# Patient Record
Sex: FEMALE | URBAN - METROPOLITAN AREA
[De-identification: names, ages, dates, MRNs, and addresses within clinical notes are randomized per-mention and may not be internally consistent; named-entity substitution may affect disease eponyms.]

---

## 2024-02-14 ENCOUNTER — LAB (OUTPATIENT)
Dept: URBAN - METROPOLITAN AREA CLINIC 113 | Facility: CLINIC | Age: 69
End: 2024-02-14

## 2024-02-14 ENCOUNTER — OV CON (OUTPATIENT)
Dept: URBAN - METROPOLITAN AREA CLINIC 113 | Facility: CLINIC | Age: 69
End: 2024-02-14
Payer: MEDICARE

## 2024-02-14 VITALS
DIASTOLIC BLOOD PRESSURE: 64 MMHG | BODY MASS INDEX: 32.37 KG/M2 | WEIGHT: 189.6 LBS | SYSTOLIC BLOOD PRESSURE: 112 MMHG | HEIGHT: 64 IN | RESPIRATION RATE: 20 BRPM | TEMPERATURE: 97 F | HEART RATE: 60 BPM

## 2024-02-14 DIAGNOSIS — K21.9 GERD WITHOUT ESOPHAGITIS: ICD-10-CM

## 2024-02-14 DIAGNOSIS — Z12.11 COLON CANCER SCREENING: ICD-10-CM

## 2024-02-14 DIAGNOSIS — K59.09 CHRONIC CONSTIPATION: ICD-10-CM

## 2024-02-14 PROBLEM — 266435005: Status: ACTIVE | Noted: 2024-02-14

## 2024-02-14 PROBLEM — 305058001: Status: ACTIVE | Noted: 2024-02-14

## 2024-02-14 PROBLEM — 236069009: Status: ACTIVE | Noted: 2024-02-14

## 2024-02-14 PROCEDURE — 99204 OFFICE O/P NEW MOD 45 MIN: CPT | Performed by: NURSE PRACTITIONER

## 2024-02-14 RX ORDER — RANOLAZINE 1000 MG/1
1 TABLET TABLET, EXTENDED RELEASE ORAL TWICE A DAY
Status: ACTIVE | COMMUNITY

## 2024-02-14 RX ORDER — ALBUTEROL SULFATE 90 UG/1
1 PUFF AS NEEDED AEROSOL, METERED RESPIRATORY (INHALATION)
Status: ACTIVE | COMMUNITY

## 2024-02-14 RX ORDER — ALENDRONATE SODIUM 70 MG/1
1 TABLET 30 MINUTES BEFORE THE FIRST FOOD, BEVERAGE OR MEDICINE OF THE DAY WITH PLAIN WATER TABLET ORAL
Status: ACTIVE | COMMUNITY

## 2024-02-14 RX ORDER — DOCUSATE SODIUM 100 MG/1
1 CAPSULE AS NEEDED CAPSULE ORAL TWICE A DAY
Status: ACTIVE | COMMUNITY

## 2024-02-14 RX ORDER — ONDANSETRON HYDROCHLORIDE 8 MG/1
1 TABLET AS NEEDED TABLET, FILM COATED ORAL ONCE A DAY
Status: ACTIVE | COMMUNITY

## 2024-02-14 RX ORDER — RIMEGEPANT SULFATE 75 MG/75MG
1 TABLET ON THE TONGUE AND ALLOW TO DISSOLVE TABLET, ORALLY DISINTEGRATING ORAL
Status: ACTIVE | COMMUNITY

## 2024-02-14 RX ORDER — HYDROCHLOROTHIAZIDE 25 MG/1
1 TABLET IN THE MORNING TABLET ORAL ONCE A DAY
Status: ACTIVE | COMMUNITY

## 2024-02-14 RX ORDER — METHOCARBAMOL 750 MG/1
1 TABLET TABLET ORAL TWICE DAILY
Status: ACTIVE | COMMUNITY

## 2024-02-14 RX ORDER — ISOSORBIDE MONONITRATE 30 MG/1
1 TABLET IN THE MORNING TABLET, EXTENDED RELEASE ORAL ONCE A DAY
Status: ACTIVE | COMMUNITY

## 2024-02-14 RX ORDER — OMEPRAZOLE 20 MG/1
1 CAPSULE 30 MINUTES BEFORE MORNING MEAL CAPSULE, DELAYED RELEASE ORAL ONCE A DAY
Status: ACTIVE | COMMUNITY

## 2024-02-14 RX ORDER — ATORVASTATIN CALCIUM 80 MG/1
1 TABLET TABLET, FILM COATED ORAL ONCE A DAY
Status: ACTIVE | COMMUNITY

## 2024-02-14 RX ORDER — GABAPENTIN 600 MG/1
1 TABLET TABLET, FILM COATED ORAL THREE TIMES A DAY
Status: ACTIVE | COMMUNITY

## 2024-02-14 RX ORDER — NITROGLYCERIN 0.4 MG/1
AS DIRECTED TABLET SUBLINGUAL
Status: ACTIVE | COMMUNITY

## 2024-02-14 RX ORDER — APIXABAN 5 MG/1
1 TABLET TABLET, FILM COATED ORAL TWICE A DAY
Status: ACTIVE | COMMUNITY

## 2024-02-14 NOTE — HPI-TODAY'S VISIT:
This is a 68-year-old female with a history of asthma, hypercholesterolemia, hypertension, atrial fibrillation on Eliquis, coronary artery disease status post bypass, pacemaker placement, and AICD placement referred from Dr. White for colon cancer screening. She understands some English but requires interpretation which her  provides. She had a colonoscopy 25 years ago revealing diverticulosis.  She denies a family history of colon cancer. She has a history of acid reflux which is controlled with omeprazole 20 mg daily.  She denies dysphagia or abdominal pain.  She has nausea associated with headaches relieved with medication.  She has chronic constipation reporting a bowel movement every 3 days.  Her stools are hard most of the time despite stool softeners 4/day.  Occasionally, she drinks prune juice and has loose or liquid stools afterward.  She denies red blood per rectum or melena.  She reports anal pain associated with ingesting spicy food.  She denies other abdominal symptoms. Her  reports a stable cardiac status since hospital discharge in June of last year.  She had her last follow-up visit with Dr. Suarez on 1/10/2024.  She has nitroglycerin and has required daily use since October for exertional chest pain.  This is effective in providing relief.  This was not mentioned to Dr Suarez at her last visit.

## 2024-06-14 ENCOUNTER — OFFICE VISIT (OUTPATIENT)
Dept: URBAN - METROPOLITAN AREA CLINIC 113 | Facility: CLINIC | Age: 69
End: 2024-06-14
Payer: MEDICARE

## 2024-06-14 ENCOUNTER — DASHBOARD ENCOUNTERS (OUTPATIENT)
Age: 69
End: 2024-06-14

## 2024-06-14 ENCOUNTER — LAB OUTSIDE AN ENCOUNTER (OUTPATIENT)
Dept: URBAN - METROPOLITAN AREA CLINIC 113 | Facility: CLINIC | Age: 69
End: 2024-06-14

## 2024-06-14 VITALS
HEART RATE: 63 BPM | DIASTOLIC BLOOD PRESSURE: 56 MMHG | TEMPERATURE: 97.3 F | RESPIRATION RATE: 20 BRPM | WEIGHT: 184.2 LBS | HEIGHT: 64 IN | BODY MASS INDEX: 31.45 KG/M2 | SYSTOLIC BLOOD PRESSURE: 110 MMHG

## 2024-06-14 DIAGNOSIS — Z12.11 COLON CANCER SCREENING: ICD-10-CM

## 2024-06-14 DIAGNOSIS — K21.9 GERD WITHOUT ESOPHAGITIS: ICD-10-CM

## 2024-06-14 DIAGNOSIS — K59.09 CHRONIC CONSTIPATION: ICD-10-CM

## 2024-06-14 DIAGNOSIS — R13.19 ESOPHAGEAL DYSPHAGIA: ICD-10-CM

## 2024-06-14 PROBLEM — 79922009: Status: ACTIVE | Noted: 2024-06-14

## 2024-06-14 PROBLEM — 40890009: Status: ACTIVE | Noted: 2024-06-14

## 2024-06-14 PROCEDURE — 99214 OFFICE O/P EST MOD 30 MIN: CPT | Performed by: NURSE PRACTITIONER

## 2024-06-14 RX ORDER — GABAPENTIN 600 MG/1
1 TABLET TABLET, FILM COATED ORAL THREE TIMES A DAY
Status: ACTIVE | COMMUNITY

## 2024-06-14 RX ORDER — ONDANSETRON HYDROCHLORIDE 8 MG/1
1 TABLET AS NEEDED TABLET, FILM COATED ORAL ONCE A DAY
Status: ACTIVE | COMMUNITY

## 2024-06-14 RX ORDER — ATORVASTATIN CALCIUM 80 MG/1
1 TABLET TABLET, FILM COATED ORAL ONCE A DAY
Status: ACTIVE | COMMUNITY

## 2024-06-14 RX ORDER — PLECANATIDE 3 MG/1
1 TABLET TABLET ORAL ONCE A DAY
Qty: 30 | Refills: 3 | OUTPATIENT
Start: 2024-06-14 | End: 2024-10-12

## 2024-06-14 RX ORDER — ALENDRONATE SODIUM 70 MG/1
1 TABLET 30 MINUTES BEFORE THE FIRST FOOD, BEVERAGE OR MEDICINE OF THE DAY WITH PLAIN WATER TABLET ORAL
Status: ACTIVE | COMMUNITY

## 2024-06-14 RX ORDER — DOCUSATE SODIUM 100 MG/1
1 CAPSULE AS NEEDED CAPSULE ORAL TWICE A DAY
Status: ACTIVE | COMMUNITY

## 2024-06-14 RX ORDER — OMEPRAZOLE 40 MG/1
1 CAPSULE 30 MINUTES BEFORE MORNING MEAL CAPSULE, DELAYED RELEASE ORAL ONCE A DAY
Qty: 30 | Refills: 5 | OUTPATIENT
Start: 2024-06-14

## 2024-06-14 RX ORDER — ALBUTEROL SULFATE 90 UG/1
1 PUFF AS NEEDED AEROSOL, METERED RESPIRATORY (INHALATION)
Status: ACTIVE | COMMUNITY

## 2024-06-14 RX ORDER — NITROGLYCERIN 0.4 MG/1
AS DIRECTED TABLET SUBLINGUAL
Status: ACTIVE | COMMUNITY

## 2024-06-14 RX ORDER — ISOSORBIDE MONONITRATE 30 MG/1
1 TABLET IN THE MORNING TABLET, EXTENDED RELEASE ORAL ONCE A DAY
Status: ACTIVE | COMMUNITY

## 2024-06-14 RX ORDER — METHOCARBAMOL 750 MG/1
1 TABLET TABLET ORAL TWICE DAILY
Status: ACTIVE | COMMUNITY

## 2024-06-14 RX ORDER — RANOLAZINE 1000 MG/1
1 TABLET TABLET, EXTENDED RELEASE ORAL TWICE A DAY
Status: ACTIVE | COMMUNITY

## 2024-06-14 RX ORDER — OMEPRAZOLE 20 MG/1
1 CAPSULE 30 MINUTES BEFORE MORNING MEAL CAPSULE, DELAYED RELEASE ORAL ONCE A DAY
Status: ACTIVE | COMMUNITY

## 2024-06-14 RX ORDER — HYDROCHLOROTHIAZIDE 25 MG/1
1 TABLET IN THE MORNING TABLET ORAL ONCE A DAY
Status: ACTIVE | COMMUNITY

## 2024-06-14 RX ORDER — APIXABAN 5 MG/1
1 TABLET TABLET, FILM COATED ORAL TWICE A DAY
Status: ACTIVE | COMMUNITY

## 2024-06-14 RX ORDER — RIMEGEPANT SULFATE 75 MG/75MG
1 TABLET ON THE TONGUE AND ALLOW TO DISSOLVE TABLET, ORALLY DISINTEGRATING ORAL
Status: ACTIVE | COMMUNITY

## 2024-06-14 NOTE — PHYSICAL EXAM GASTROINTESTINAL
Abdomen , soft, tender across upper abdomen, nondistended , no guarding or rigidity , no masses palpable , normal bowel sounds , Liver and Spleen , no hepatosplenomegaly

## 2024-06-14 NOTE — HPI-TODAY'S VISIT:
This is a 68-year-old female with a history of asthma, hypercholesterolemia, hypertension, atrial fibrillation on Eliquis, coronary artery disease status post bypass, pacemaker placement and AICD placement presenting for follow-up.  She was last seen in the office 2/14/2024.  She had been referred from her primary care physician for colon cancer screening.  She reported a history of chronic constipation.  Symptoms were persistent taking for stool softeners daily.  She was to start MiraLAX 1 capful twice a day and titrate to effect.  She was also instructed to try milk of magnesia as needed.  She was average risk for colon cancer and was overdue screening.  Due to her history of coronary artery disease, atrial fibrillation, ranolazine therapy for angina, and daily requirement of nitroglycerin for exertional chest pain beginning in October, cardiac clearance was requested prior to scheduling at the hospital.  Clearance letter has been faxed twice and has not been received from her cardiologist.  Her  provides interpretation.  He reports that  is aware that she is requiring nitroglycerin for chest pain, currently 3-4 times per week.  He informed her  that she may undergo endoscopic evaluation.  Her  asks for a copy of the clearance letter so that he can hand deliver it to Dr. Her.   She does not like MiraLAX.  She does not want to take liquid medication.  She reports diarrhea after taking it.  She is currently having a small bowel movement daily with a sensation of incomplete evacuation.  She is asking for a pill for constipation. She is having pain indicated in the epigastrium and across her upper abdomen occurring postprandially and lasting 30 minutes, usually relieved with Trish-[place].  She is taking omeprazole 20 mg daily.  She reports food lodging indicated in the upper portion of her esophagus with every meal relieved with drinking liquids.  She has frequent nausea without vomiting.  She denies red blood per rectum or other abdominal symptoms.

## 2024-06-15 LAB
A/G RATIO: 1.9
ABSOLUTE BASOPHILS: 40
ABSOLUTE EOSINOPHILS: 47
ABSOLUTE LYMPHOCYTES: 2111
ABSOLUTE MONOCYTES: 777
ABSOLUTE NEUTROPHILS: 3725
ALBUMIN: 4.3
ALKALINE PHOSPHATASE: 44
ALT (SGPT): 22
AST (SGOT): 19
BASOPHILS: 0.6
BILIRUBIN, TOTAL: 0.9
BUN/CREATININE RATIO: (no result)
BUN: 16
CALCIUM: 9.9
CARBON DIOXIDE, TOTAL: 29
CHLORIDE: 104
CREATININE: 0.89
EGFR: 70
EOSINOPHILS: 0.7
GLOBULIN, TOTAL: 2.3
GLUCOSE: 112
HEMATOCRIT: 40.4
HEMOGLOBIN: 14.1
LIPASE: 22
LYMPHOCYTES: 31.5
MCH: 34.9
MCHC: 34.9
MCV: 100
MONOCYTES: 11.6
MPV: 11.4
NEUTROPHILS: 55.6
PLATELET COUNT: 185
POTASSIUM: 3.7
PROTEIN, TOTAL: 6.6
RDW: 11.8
RED BLOOD CELL COUNT: 4.04
SODIUM: 142
WHITE BLOOD CELL COUNT: 6.7

## 2024-06-17 ENCOUNTER — TELEPHONE ENCOUNTER (OUTPATIENT)
Dept: URBAN - METROPOLITAN AREA CLINIC 113 | Facility: CLINIC | Age: 69
End: 2024-06-17

## 2024-06-17 RX ORDER — PLECANATIDE 3 MG/1
1 TABLET TABLET ORAL ONCE A DAY
Qty: 30 | Refills: 3
Start: 2024-06-14 | End: 2024-10-15

## 2024-09-05 ENCOUNTER — TELEPHONE ENCOUNTER (OUTPATIENT)
Dept: URBAN - METROPOLITAN AREA CLINIC 113 | Facility: CLINIC | Age: 69
End: 2024-09-05

## 2024-09-05 ENCOUNTER — OFFICE VISIT (OUTPATIENT)
Dept: URBAN - METROPOLITAN AREA MEDICAL CENTER 19 | Facility: MEDICAL CENTER | Age: 69
End: 2024-09-05

## 2024-09-05 RX ORDER — GABAPENTIN 600 MG/1
1 TABLET TABLET, FILM COATED ORAL THREE TIMES A DAY
Status: ACTIVE | COMMUNITY

## 2024-09-05 RX ORDER — RIMEGEPANT SULFATE 75 MG/75MG
1 TABLET ON THE TONGUE AND ALLOW TO DISSOLVE TABLET, ORALLY DISINTEGRATING ORAL
Status: ACTIVE | COMMUNITY

## 2024-09-05 RX ORDER — NITROGLYCERIN 0.4 MG/1
AS DIRECTED TABLET SUBLINGUAL
Status: ACTIVE | COMMUNITY

## 2024-09-05 RX ORDER — ALENDRONATE SODIUM 70 MG/1
1 TABLET 30 MINUTES BEFORE THE FIRST FOOD, BEVERAGE OR MEDICINE OF THE DAY WITH PLAIN WATER TABLET ORAL
Status: ACTIVE | COMMUNITY

## 2024-09-05 RX ORDER — PLECANATIDE 3 MG/1
1 TABLET TABLET ORAL ONCE A DAY
Qty: 30 | Refills: 3 | Status: ACTIVE | COMMUNITY
Start: 2024-06-14 | End: 2024-10-15

## 2024-09-05 RX ORDER — HYDROCHLOROTHIAZIDE 25 MG/1
1 TABLET IN THE MORNING TABLET ORAL ONCE A DAY
Status: ACTIVE | COMMUNITY

## 2024-09-05 RX ORDER — ONDANSETRON HYDROCHLORIDE 8 MG/1
1 TABLET AS NEEDED TABLET, FILM COATED ORAL ONCE A DAY
Status: ACTIVE | COMMUNITY

## 2024-09-05 RX ORDER — OMEPRAZOLE 40 MG/1
1 CAPSULE 30 MINUTES BEFORE MORNING MEAL CAPSULE, DELAYED RELEASE ORAL ONCE A DAY
Qty: 30 | Refills: 5 | Status: ACTIVE | COMMUNITY
Start: 2024-06-14

## 2024-09-05 RX ORDER — APIXABAN 5 MG/1
1 TABLET TABLET, FILM COATED ORAL TWICE A DAY
Status: ACTIVE | COMMUNITY

## 2024-09-05 RX ORDER — RANOLAZINE 1000 MG/1
1 TABLET TABLET, EXTENDED RELEASE ORAL TWICE A DAY
Status: ACTIVE | COMMUNITY

## 2024-09-05 RX ORDER — DOCUSATE SODIUM 100 MG/1
1 CAPSULE AS NEEDED CAPSULE ORAL TWICE A DAY
Status: ACTIVE | COMMUNITY

## 2024-09-05 RX ORDER — ISOSORBIDE MONONITRATE 30 MG/1
1 TABLET IN THE MORNING TABLET, EXTENDED RELEASE ORAL ONCE A DAY
Status: ACTIVE | COMMUNITY

## 2024-09-05 RX ORDER — ATORVASTATIN CALCIUM 80 MG/1
1 TABLET TABLET, FILM COATED ORAL ONCE A DAY
Status: ACTIVE | COMMUNITY

## 2024-09-05 RX ORDER — ALBUTEROL SULFATE 90 UG/1
1 PUFF AS NEEDED AEROSOL, METERED RESPIRATORY (INHALATION)
Status: ACTIVE | COMMUNITY

## 2024-09-05 RX ORDER — METHOCARBAMOL 750 MG/1
1 TABLET TABLET ORAL TWICE DAILY
Status: ACTIVE | COMMUNITY

## 2024-09-05 RX ORDER — OMEPRAZOLE 20 MG/1
1 CAPSULE 30 MINUTES BEFORE MORNING MEAL CAPSULE, DELAYED RELEASE ORAL ONCE A DAY
Status: ACTIVE | COMMUNITY

## 2024-09-16 ENCOUNTER — OFFICE VISIT (OUTPATIENT)
Dept: URBAN - METROPOLITAN AREA CLINIC 113 | Facility: CLINIC | Age: 69
End: 2024-09-16
Payer: MEDICARE

## 2024-09-16 VITALS
WEIGHT: 178 LBS | HEIGHT: 64 IN | SYSTOLIC BLOOD PRESSURE: 117 MMHG | BODY MASS INDEX: 30.39 KG/M2 | HEART RATE: 68 BPM | RESPIRATION RATE: 20 BRPM | DIASTOLIC BLOOD PRESSURE: 59 MMHG | TEMPERATURE: 97.7 F

## 2024-09-16 DIAGNOSIS — Z12.11 COLON CANCER SCREENING: ICD-10-CM

## 2024-09-16 DIAGNOSIS — K59.09 CHRONIC CONSTIPATION: ICD-10-CM

## 2024-09-16 DIAGNOSIS — R13.19 ESOPHAGEAL DYSPHAGIA: ICD-10-CM

## 2024-09-16 DIAGNOSIS — K21.9 GERD WITHOUT ESOPHAGITIS: ICD-10-CM

## 2024-09-16 PROCEDURE — 99213 OFFICE O/P EST LOW 20 MIN: CPT | Performed by: NURSE PRACTITIONER

## 2024-09-16 RX ORDER — RIMEGEPANT SULFATE 75 MG/75MG
1 TABLET ON THE TONGUE AND ALLOW TO DISSOLVE TABLET, ORALLY DISINTEGRATING ORAL
Status: ACTIVE | COMMUNITY

## 2024-09-16 RX ORDER — RANOLAZINE 1000 MG/1
1 TABLET TABLET, EXTENDED RELEASE ORAL TWICE A DAY
Status: ACTIVE | COMMUNITY

## 2024-09-16 RX ORDER — ATORVASTATIN CALCIUM 80 MG/1
1 TABLET TABLET, FILM COATED ORAL ONCE A DAY
Status: ACTIVE | COMMUNITY

## 2024-09-16 RX ORDER — OMEPRAZOLE 20 MG/1
1 CAPSULE 30 MINUTES BEFORE MORNING MEAL CAPSULE, DELAYED RELEASE ORAL ONCE A DAY
Status: ON HOLD | COMMUNITY

## 2024-09-16 RX ORDER — CYCLOBENZAPRINE HYDROCHLORIDE 10 MG/1
1 TABLET AT BEDTIME AS NEEDED TABLET, FILM COATED ORAL ONCE A DAY
Status: ACTIVE | COMMUNITY

## 2024-09-16 RX ORDER — HYDROCHLOROTHIAZIDE 25 MG/1
1 TABLET IN THE MORNING TABLET ORAL ONCE A DAY
Status: ACTIVE | COMMUNITY

## 2024-09-16 RX ORDER — ISOSORBIDE MONONITRATE 30 MG/1
1 TABLET IN THE MORNING TABLET, EXTENDED RELEASE ORAL ONCE A DAY
Status: ACTIVE | COMMUNITY

## 2024-09-16 RX ORDER — ALBUTEROL SULFATE 90 UG/1
1 PUFF AS NEEDED AEROSOL, METERED RESPIRATORY (INHALATION)
Status: ACTIVE | COMMUNITY

## 2024-09-16 RX ORDER — GABAPENTIN 600 MG/1
1 TABLET TABLET, FILM COATED ORAL THREE TIMES A DAY
Status: ACTIVE | COMMUNITY

## 2024-09-16 RX ORDER — APIXABAN 5 MG/1
1 TABLET TABLET, FILM COATED ORAL TWICE A DAY
Status: ACTIVE | COMMUNITY

## 2024-09-16 RX ORDER — ONDANSETRON HYDROCHLORIDE 8 MG/1
1 TABLET AS NEEDED TABLET, FILM COATED ORAL ONCE A DAY
Status: ACTIVE | COMMUNITY

## 2024-09-16 RX ORDER — METHOCARBAMOL 750 MG/1
1 TABLET TABLET ORAL TWICE DAILY
Status: ACTIVE | COMMUNITY

## 2024-09-16 RX ORDER — OMEPRAZOLE 20 MG/1
1 CAPSULE 30 MINUTES BEFORE MORNING MEAL CAPSULE, DELAYED RELEASE ORAL ONCE A DAY
Refills: 5 | Status: ACTIVE | COMMUNITY
Start: 2024-06-14

## 2024-09-16 RX ORDER — DOCUSATE SODIUM 100 MG/1
1 CAPSULE AS NEEDED CAPSULE ORAL TWICE A DAY
Status: ON HOLD | COMMUNITY

## 2024-09-16 RX ORDER — PLECANATIDE 3 MG/1
1 TABLET TABLET ORAL ONCE A DAY
Qty: 30 | Refills: 3 | Status: ON HOLD | COMMUNITY
Start: 2024-06-14 | End: 2024-10-15

## 2024-09-16 RX ORDER — NITROGLYCERIN 0.4 MG/1
AS DIRECTED TABLET SUBLINGUAL
Status: ACTIVE | COMMUNITY

## 2024-09-16 RX ORDER — ALENDRONATE SODIUM 70 MG/1
1 TABLET 30 MINUTES BEFORE THE FIRST FOOD, BEVERAGE OR MEDICINE OF THE DAY WITH PLAIN WATER TABLET ORAL
Status: ON HOLD | COMMUNITY

## 2024-09-16 NOTE — HPI-OTHER HISTORIES
CT abdomen without contrast 6/28/2024:No acute abnormality in the abdomen. Large volume of feces in the colon, suggestive of constipation. Compression fracture of L1 vertebral body is new since prior CT 6/6/2023. Compression fractures of T11 and L4 vertebral bodies are unchanged.

## 2024-09-16 NOTE — HPI-TODAY'S VISIT:
This is a 68-year-old female with a history of asthma, hypercholesterolemia, hypertension, atrial fibrillation on Eliquis, coronary artery disease status post bypass, pacemaker placement and AICD placement, chronic constipation, GERD, esophageal dysphagia, and epigastric pain presenting for follow-up. She was last seen in the office 6/14/2024.  She denied heartburn or regurgitation on omeprazole 20 mg daily.  She was complaining of postprandial epigastric pain relieved with Trish-[place] and esophageal dysphagia occurring with every meal.  She was to increase omeprazole from 20 mg daily to 40 mg daily.  She was scheduled for EGD with dilation along with screening colonoscopy following cardiac clearance and permission to hold Eliquis for 2 days.  Labs were ordered and a CT scan was scheduled.  She had diarrhea associated with taking MiraLAX and did not want to take liquid medication.  She was prescribed a trial of Trulance 3 mg daily.  EGD and colonoscopy were scheduled 9/5/2024 at [facility].  She required a PICC line.  The procedure was to be rescheduled.  CT abdomen without contrast 6/28/2024:No acute abnormality in the abdomen.  Large volume of feces in the colon, suggestive of constipation.  Compression fracture of L1 vertebral body is new since prior CT 6/6/2023.  Compression fractures of T11 and L4 vertebral bodies are unchanged.  Her  assist with interpretation.  She is taking MiraLAX daily.  She is having a soft/"runny" stool every other day.  She continues to feel a sensation of constipation.  She started omeprazole 40 mg daily and had abdominal pain.  Abdominal pain has resolved since she decreased to 20 mg daily.  Acid reflux symptoms have improved.  She has diet dependent breakthrough controlled with dietary modification.  She continues to report food lodging in the base of her throat relieved with drinking liquids.

## 2024-10-25 ENCOUNTER — OFFICE VISIT (OUTPATIENT)
Dept: URBAN - METROPOLITAN AREA MEDICAL CENTER 43 | Facility: MEDICAL CENTER | Age: 69
End: 2024-10-25

## 2024-11-18 ENCOUNTER — OFFICE VISIT (OUTPATIENT)
Dept: URBAN - METROPOLITAN AREA CLINIC 113 | Facility: CLINIC | Age: 69
End: 2024-11-18
Payer: MEDICARE

## 2024-11-18 VITALS
DIASTOLIC BLOOD PRESSURE: 62 MMHG | RESPIRATION RATE: 20 BRPM | SYSTOLIC BLOOD PRESSURE: 115 MMHG | HEART RATE: 62 BPM | BODY MASS INDEX: 30.05 KG/M2 | WEIGHT: 176 LBS | HEIGHT: 64 IN | TEMPERATURE: 97.2 F

## 2024-11-18 DIAGNOSIS — Z12.11 COLON CANCER SCREENING: ICD-10-CM

## 2024-11-18 DIAGNOSIS — K21.9 GERD WITHOUT ESOPHAGITIS: ICD-10-CM

## 2024-11-18 DIAGNOSIS — K59.09 CHRONIC CONSTIPATION: ICD-10-CM

## 2024-11-18 PROCEDURE — 99203 OFFICE O/P NEW LOW 30 MIN: CPT | Performed by: NURSE PRACTITIONER

## 2024-11-18 RX ORDER — OMEPRAZOLE 20 MG/1
1 CAPSULE 30 MINUTES BEFORE MORNING MEAL CAPSULE, DELAYED RELEASE ORAL ONCE A DAY
Refills: 5 | Status: ACTIVE | COMMUNITY
Start: 2024-06-14

## 2024-11-18 RX ORDER — RANOLAZINE 1000 MG/1
1 TABLET TABLET, EXTENDED RELEASE ORAL TWICE A DAY
Status: ACTIVE | COMMUNITY

## 2024-11-18 RX ORDER — NITROGLYCERIN 0.4 MG/1
AS DIRECTED TABLET SUBLINGUAL
Status: ACTIVE | COMMUNITY

## 2024-11-18 RX ORDER — CYCLOBENZAPRINE HYDROCHLORIDE 10 MG/1
1 TABLET AT BEDTIME AS NEEDED TABLET, FILM COATED ORAL ONCE A DAY
Status: ACTIVE | COMMUNITY

## 2024-11-18 RX ORDER — ISOSORBIDE MONONITRATE 30 MG/1
1 TABLET IN THE MORNING TABLET, EXTENDED RELEASE ORAL ONCE A DAY
Status: ACTIVE | COMMUNITY

## 2024-11-18 RX ORDER — DOCUSATE SODIUM 100 MG/1
1 CAPSULE AS NEEDED CAPSULE ORAL TWICE A DAY
Status: ON HOLD | COMMUNITY

## 2024-11-18 RX ORDER — RIMEGEPANT SULFATE 75 MG/75MG
1 TABLET ON THE TONGUE AND ALLOW TO DISSOLVE TABLET, ORALLY DISINTEGRATING ORAL
Status: ACTIVE | COMMUNITY

## 2024-11-18 RX ORDER — GABAPENTIN 600 MG/1
1 TABLET TABLET, FILM COATED ORAL THREE TIMES A DAY
Status: ACTIVE | COMMUNITY

## 2024-11-18 RX ORDER — OMEPRAZOLE 20 MG/1
1 CAPSULE 30 MINUTES BEFORE MORNING MEAL CAPSULE, DELAYED RELEASE ORAL ONCE A DAY
Status: ON HOLD | COMMUNITY

## 2024-11-18 RX ORDER — ONDANSETRON HYDROCHLORIDE 8 MG/1
1 TABLET AS NEEDED TABLET, FILM COATED ORAL ONCE A DAY
Status: ACTIVE | COMMUNITY

## 2024-11-18 RX ORDER — HYDROCHLOROTHIAZIDE 25 MG/1
1 TABLET IN THE MORNING TABLET ORAL ONCE A DAY
Status: ACTIVE | COMMUNITY

## 2024-11-18 RX ORDER — ALBUTEROL SULFATE 90 UG/1
1 PUFF AS NEEDED AEROSOL, METERED RESPIRATORY (INHALATION)
Status: ACTIVE | COMMUNITY

## 2024-11-18 RX ORDER — ATORVASTATIN CALCIUM 80 MG/1
1 TABLET TABLET, FILM COATED ORAL ONCE A DAY
Status: ACTIVE | COMMUNITY

## 2024-11-18 RX ORDER — ALENDRONATE SODIUM 70 MG/1
1 TABLET 30 MINUTES BEFORE THE FIRST FOOD, BEVERAGE OR MEDICINE OF THE DAY WITH PLAIN WATER TABLET ORAL
Status: ON HOLD | COMMUNITY

## 2024-11-18 RX ORDER — METHOCARBAMOL 750 MG/1
1 TABLET TABLET ORAL TWICE DAILY
Status: ACTIVE | COMMUNITY

## 2024-11-18 RX ORDER — APIXABAN 5 MG/1
1 TABLET TABLET, FILM COATED ORAL TWICE A DAY
Status: ACTIVE | COMMUNITY

## 2024-11-18 NOTE — PHYSICAL EXAM GASTROINTESTINAL
Abdomen , soft, mild tenderness epigastrium, nondistended , no guarding or rigidity , no masses palpable , normal bowel sounds , Liver and Spleen , no hepatosplenomegaly

## 2024-11-18 NOTE — HPI-TODAY'S VISIT:
This is a 69-year-old female with a history of asthma, hypercholesterolemia, hypertension, atrial fibrillation on Eliquis, coronary artery disease status post bypass, pacemaker placement and AICD placement, chronic constipation, GERD, esophageal dysphagia, and epigastric pain presenting for follow-up. She was last seen in the office 9/16/2024.  Heartburn was controlled with omeprazole 20 mg daily and dietary modification.  She continued to report dysphagia.  Recent EGD and screening colonoscopy had to be rescheduled due to inability to obtain IV access.  She was to be rescheduled with PICC line placement.  A Clenpiq sample was provided and she was instructed to hold Eliquis for 2 days.  Chronic constipation was suboptimally managed with daily MiraLAX.  She was to add Benefiber 2 tablespoons daily.  If this was ineffective after 2 weeks, she was to increase MiraLAX to twice a day.  Her insurance would not cover Trulance.  Linzess was a future consideration.  EGD 10/25/2024:No endoscopic abnormality evident to explain dysphagia status post 45 [place] Savary dilation, normal stomach, normal examined duodenum.  No specimens collected. Colonoscopy 10/25/2024:Excellent prep, sigmoid and descending diverticulosis, normal examined terminal ileum, nonbleeding internal hemorrhoids.  No specimens were collected.  Repeat colonoscopy for screening recommended in 2034.  Her  assists with interpretation. She reports worsening pain associated with taking omeprazole 40 mg daily.  She has decreased to 20 mg daily.  She reports improvement of pain indicating epigastrium after decreasing omeprazole.  This has recurred for the last month.  It improves after a bowel movement and is worse prior to a meal.  She reports some improvement after eating.  Dysphagia has improved following dilation.  She is currently taking MiraLAX every other day.  She is having a bowel movement every 2 days reporting a small stool with incomplete evacuation.  Stools are the consistency of "hard balls."  She has not tolerated milk of magnesia in the past.  She did not try daily fiber.  She denies other abdominal symptoms.

## 2024-11-18 NOTE — HPI-OTHER HISTORIES
EGD 10/25/2024:No endoscopic abnormality evident to explain dysphagia status post 45 [place] Savary dilation, normal stomach, normal examined duodenum. No specimens collected.  Colonoscopy 10/25/2024:Excellent prep, sigmoid and descending diverticulosis, normal examined terminal ileum, nonbleeding internal hemorrhoids. No specimens were collected. Repeat colonoscopy for screening recommended in 2034.  CT abdomen without contrast 6/28/2024:No acute abnormality in the abdomen. Large volume of feces in the colon, suggestive of constipation. Compression fracture of L1 vertebral body is new since prior CT 6/6/2023. Compression fractures of T11 and L4 vertebral bodies are unchanged.

## 2025-01-09 ENCOUNTER — OFFICE VISIT (OUTPATIENT)
Dept: URBAN - METROPOLITAN AREA CLINIC 113 | Facility: CLINIC | Age: 70
End: 2025-01-09
Payer: MEDICARE

## 2025-01-09 VITALS
RESPIRATION RATE: 18 BRPM | DIASTOLIC BLOOD PRESSURE: 63 MMHG | SYSTOLIC BLOOD PRESSURE: 87 MMHG | BODY MASS INDEX: 28.85 KG/M2 | WEIGHT: 169 LBS | HEART RATE: 60 BPM | HEIGHT: 64 IN | TEMPERATURE: 97.3 F

## 2025-01-09 DIAGNOSIS — K21.9 GERD WITHOUT ESOPHAGITIS: ICD-10-CM

## 2025-01-09 DIAGNOSIS — K59.09 CHRONIC CONSTIPATION: ICD-10-CM

## 2025-01-09 PROCEDURE — 99214 OFFICE O/P EST MOD 30 MIN: CPT | Performed by: NURSE PRACTITIONER

## 2025-01-09 RX ORDER — HYDROCHLOROTHIAZIDE 25 MG/1
1 TABLET IN THE MORNING TABLET ORAL ONCE A DAY
Status: ACTIVE | COMMUNITY

## 2025-01-09 RX ORDER — ISOSORBIDE MONONITRATE 30 MG/1
1 TABLET IN THE MORNING TABLET, EXTENDED RELEASE ORAL ONCE A DAY
Status: ACTIVE | COMMUNITY

## 2025-01-09 RX ORDER — DOCUSATE SODIUM 100 MG/1
1 CAPSULE AS NEEDED CAPSULE ORAL TWICE A DAY
Status: ON HOLD | COMMUNITY

## 2025-01-09 RX ORDER — APIXABAN 5 MG/1
1 TABLET TABLET, FILM COATED ORAL TWICE A DAY
Status: ACTIVE | COMMUNITY

## 2025-01-09 RX ORDER — DICYCLOMINE HYDROCHLORIDE 10 MG/1
1 TABLET CAPSULE ORAL
Qty: 60 | Refills: 3 | OUTPATIENT
Start: 2025-01-09 | End: 2025-05-09

## 2025-01-09 RX ORDER — RIMEGEPANT SULFATE 75 MG/75MG
1 TABLET ON THE TONGUE AND ALLOW TO DISSOLVE TABLET, ORALLY DISINTEGRATING ORAL
Status: ACTIVE | COMMUNITY

## 2025-01-09 RX ORDER — NITROGLYCERIN 0.4 MG/1
AS DIRECTED TABLET SUBLINGUAL
Status: ACTIVE | COMMUNITY

## 2025-01-09 RX ORDER — GABAPENTIN 600 MG/1
1 TABLET TABLET, FILM COATED ORAL THREE TIMES A DAY
Status: ACTIVE | COMMUNITY

## 2025-01-09 RX ORDER — ALBUTEROL SULFATE 90 UG/1
1 PUFF AS NEEDED AEROSOL, METERED RESPIRATORY (INHALATION)
Status: ACTIVE | COMMUNITY

## 2025-01-09 RX ORDER — METHOCARBAMOL 750 MG/1
1 TABLET TABLET ORAL TWICE DAILY
Status: ACTIVE | COMMUNITY

## 2025-01-09 RX ORDER — ONDANSETRON HYDROCHLORIDE 8 MG/1
1 TABLET AS NEEDED TABLET, FILM COATED ORAL ONCE A DAY
Status: ACTIVE | COMMUNITY

## 2025-01-09 RX ORDER — ALENDRONATE SODIUM 70 MG/1
1 TABLET 30 MINUTES BEFORE THE FIRST FOOD, BEVERAGE OR MEDICINE OF THE DAY WITH PLAIN WATER TABLET ORAL
Status: ON HOLD | COMMUNITY

## 2025-01-09 RX ORDER — OMEPRAZOLE 20 MG/1
1 CAPSULE 30 MINUTES BEFORE MORNING MEAL CAPSULE, DELAYED RELEASE ORAL ONCE A DAY
Refills: 5 | Status: ACTIVE | COMMUNITY
Start: 2024-06-14

## 2025-01-09 RX ORDER — OMEPRAZOLE 20 MG/1
1 CAPSULE 30 MINUTES BEFORE MORNING MEAL CAPSULE, DELAYED RELEASE ORAL ONCE A DAY
Status: ON HOLD | COMMUNITY

## 2025-01-09 RX ORDER — RANOLAZINE 1000 MG/1
1 TABLET TABLET, EXTENDED RELEASE ORAL TWICE A DAY
Status: ACTIVE | COMMUNITY

## 2025-01-09 RX ORDER — CYCLOBENZAPRINE HYDROCHLORIDE 10 MG/1
1 TABLET AT BEDTIME AS NEEDED TABLET, FILM COATED ORAL ONCE A DAY
Status: ON HOLD | COMMUNITY

## 2025-01-09 RX ORDER — ATORVASTATIN CALCIUM 80 MG/1
1 TABLET TABLET, FILM COATED ORAL ONCE A DAY
Status: ACTIVE | COMMUNITY

## 2025-01-09 NOTE — HPI-OTHER HISTORIES
EGD 10/25/2024:No endoscopic abnormality evident to explain dysphagia status post 45 [place] Savary dilation, normal stomach, normal examined duodenum. No specimens collected.  Colonoscopy 10/25/2024:Excellent prep, sigmoid and descending diverticulosis, normal examined terminal ileum, nonbleeding internal hemorrhoids. No specimens were collected. Repeat colonoscopy for screening recommended in 2034.  CT abdomen without contrast 6/28/2024:No acute abnormality in the abdomen. Large volume of feces in the colon, suggestive of constipation. Compression fracture of L1 vertebral body is new since prior CT 6/6/2023. Compression fractures of T11 and L4 vertebral bodies are unchanged.

## 2025-01-09 NOTE — HPI-TODAY'S VISIT:
This is a 69-year-old female with a history of asthma, hypercholesterolemia, hypertension, atrial fibrillation on Eliquis, coronary artery disease status post bypass, pacemaker placement and AICD placement, chronic constipation, GERD, esophageal dysphagia, epigastric pain presenting for follow-up regarding abdominal pain. She was last seen 11/18/2024.  She denied heartburn or regurgitation on omeprazole 40 mg daily.  Dysphagia had improved following recent empiric dilation.  She continued to report epigastric pain.  Evaluation with EGD and CT were negative for source with the exception of constipation.  She reported pain improved after a meal.  Spasm or visceral hypersensitivity were within the differential.  She had dicyclomine on hand.  She was to try this as needed.  Improvement of bowel regimen was recommended.  Due to negative EGD findings, she was authorized to discontinue omeprazole 20 mg daily.  She was to restart if she had heartburn.  Constipation was suboptimally managed with MiraLAX every other day.  She had tried milk of magnesia in the past and found it distasteful.  She had not tried fiber.  She reported daily MiraLAX causes runny stools.  She was to begin Benefiber 2 tablespoons daily with one half dose of MiraLAX daily.  She was to titrate MiraLAX to effect.  Colonoscopy for screening recommended in 2034. Her  provides interpretation for her. She has persistent pain indicated in the epigastric area.  It is described as burning.  It has worsened since her last visit.  It is constant and worsens after eating.  She has intermittent nausea associated with food odors.  Her  reports she is not eating well.  She is having small bowel movements daily.  She takes Metamucil 2 teaspoons and a capful of MiraLAX daily for 3 days.  Once her bowels begin to move, she discontinues it and restarts it when she develops constipation. He reports that her symptoms began when she started omeprazole.  At first, they improved after decreasing the dose but have been persistent.  There is concern regarding side effects.  Also, she has used dicyclomine in the past that may have been helpful.  She took amitriptyline in the distant past for some other condition.  He cannot recall why she was taking it or why it was stopped.

## 2025-02-18 ENCOUNTER — OFFICE VISIT (OUTPATIENT)
Dept: URBAN - METROPOLITAN AREA CLINIC 113 | Facility: CLINIC | Age: 70
End: 2025-02-18
Payer: MEDICARE

## 2025-02-18 VITALS
SYSTOLIC BLOOD PRESSURE: 98 MMHG | TEMPERATURE: 96.6 F | HEART RATE: 64 BPM | WEIGHT: 166 LBS | DIASTOLIC BLOOD PRESSURE: 57 MMHG | BODY MASS INDEX: 28.34 KG/M2 | HEIGHT: 64 IN

## 2025-02-18 DIAGNOSIS — K59.09 CHRONIC CONSTIPATION: ICD-10-CM

## 2025-02-18 DIAGNOSIS — K21.9 GERD WITHOUT ESOPHAGITIS: ICD-10-CM

## 2025-02-18 PROCEDURE — 99214 OFFICE O/P EST MOD 30 MIN: CPT | Performed by: NURSE PRACTITIONER

## 2025-02-18 PROCEDURE — 99213 OFFICE O/P EST LOW 20 MIN: CPT | Performed by: NURSE PRACTITIONER

## 2025-02-18 RX ORDER — ROMOSOZUMAB-AQQG 105 MG/1.17ML
2.34 ML INJECTION, SOLUTION SUBCUTANEOUS
Status: ACTIVE | COMMUNITY

## 2025-02-18 RX ORDER — APIXABAN 5 MG/1
1 TABLET TABLET, FILM COATED ORAL TWICE A DAY
Status: ACTIVE | COMMUNITY

## 2025-02-18 RX ORDER — ALENDRONATE SODIUM 70 MG/1
1 TABLET 30 MINUTES BEFORE THE FIRST FOOD, BEVERAGE OR MEDICINE OF THE DAY WITH PLAIN WATER TABLET ORAL
Status: ON HOLD | COMMUNITY

## 2025-02-18 RX ORDER — ONDANSETRON HYDROCHLORIDE 8 MG/1
1 TABLET AS NEEDED TABLET, FILM COATED ORAL ONCE A DAY
Status: ACTIVE | COMMUNITY

## 2025-02-18 RX ORDER — METHOCARBAMOL 750 MG/1
1 TABLET TABLET ORAL TWICE DAILY
Status: ACTIVE | COMMUNITY

## 2025-02-18 RX ORDER — ROSUVASTATIN CALCIUM 40 MG/1
1 TABLET TABLET, COATED ORAL ONCE A DAY
Status: ACTIVE | COMMUNITY

## 2025-02-18 RX ORDER — RIMEGEPANT SULFATE 75 MG/75MG
1 TABLET ON THE TONGUE AND ALLOW TO DISSOLVE TABLET, ORALLY DISINTEGRATING ORAL
Status: ACTIVE | COMMUNITY

## 2025-02-18 RX ORDER — GABAPENTIN 600 MG/1
1 TABLET TABLET, FILM COATED ORAL THREE TIMES A DAY
Status: ACTIVE | COMMUNITY

## 2025-02-18 RX ORDER — RANOLAZINE 1000 MG/1
1 TABLET TABLET, EXTENDED RELEASE ORAL TWICE A DAY
Status: ACTIVE | COMMUNITY

## 2025-02-18 RX ORDER — OMEPRAZOLE 20 MG/1
1 CAPSULE 30 MINUTES BEFORE MORNING MEAL CAPSULE, DELAYED RELEASE ORAL ONCE A DAY
Status: ON HOLD | COMMUNITY

## 2025-02-18 RX ORDER — ATORVASTATIN CALCIUM 80 MG/1
1 TABLET TABLET, FILM COATED ORAL ONCE A DAY
Status: ON HOLD | COMMUNITY

## 2025-02-18 RX ORDER — ALBUTEROL SULFATE 90 UG/1
1 PUFF AS NEEDED AEROSOL, METERED RESPIRATORY (INHALATION)
Status: ACTIVE | COMMUNITY

## 2025-02-18 RX ORDER — DICYCLOMINE HYDROCHLORIDE 10 MG/1
1 TABLET CAPSULE ORAL
Qty: 60 | Refills: 3 | Status: ACTIVE | COMMUNITY
Start: 2025-01-09 | End: 2025-05-09

## 2025-02-18 RX ORDER — NITROGLYCERIN 0.4 MG/1
AS DIRECTED TABLET SUBLINGUAL
Status: ACTIVE | COMMUNITY

## 2025-02-18 RX ORDER — DOCUSATE SODIUM 100 MG/1
1 CAPSULE AS NEEDED CAPSULE ORAL TWICE A DAY
Status: ON HOLD | COMMUNITY

## 2025-02-18 RX ORDER — HYDROCHLOROTHIAZIDE 25 MG/1
1 TABLET IN THE MORNING TABLET ORAL ONCE A DAY
Status: ACTIVE | COMMUNITY

## 2025-02-18 RX ORDER — DICYCLOMINE HYDROCHLORIDE 10 MG/1
1 TABLET CAPSULE ORAL
Qty: 100 | Refills: 3
Start: 2025-01-09 | End: 2026-02-13

## 2025-02-18 RX ORDER — OMEPRAZOLE 20 MG/1
1 CAPSULE 30 MINUTES BEFORE MORNING MEAL CAPSULE, DELAYED RELEASE ORAL ONCE A DAY
Qty: 90 | Refills: 3

## 2025-02-18 RX ORDER — OMEPRAZOLE 20 MG/1
1 CAPSULE 30 MINUTES BEFORE MORNING MEAL CAPSULE, DELAYED RELEASE ORAL ONCE A DAY
Refills: 5 | Status: ON HOLD | COMMUNITY
Start: 2024-06-14

## 2025-02-18 RX ORDER — ISOSORBIDE MONONITRATE 30 MG/1
1 TABLET IN THE MORNING TABLET, EXTENDED RELEASE ORAL ONCE A DAY
Status: ACTIVE | COMMUNITY

## 2025-02-18 RX ORDER — CYCLOBENZAPRINE HYDROCHLORIDE 10 MG/1
1 TABLET AT BEDTIME AS NEEDED TABLET, FILM COATED ORAL ONCE A DAY
Status: ON HOLD | COMMUNITY

## 2025-02-18 NOTE — HPI-TODAY'S VISIT:
This is a 69-year-old female with a history of asthma, hypercholesterolemia, hypertension, atrial fibrillation on Eliquis, coronary artery disease status post bypass, pacemaker placement and AICD placement, chronic constipation, GERD, esophageal dysphagia, epigastric pain presenting for follow-up. She was last seen in 1/9/2025.  Epigastric pain had been persistent for 6 months.  Labs, CT, and EGD were unremarkable for source.  Her  reported that pain began after taking omeprazole and improved some after decreasing the dose.  There was concern regarding side effect.  She was to discontinue it.  She had benefited from dicyclomine.  This was refilled for as needed use.  He was to call in 2 weeks if symptoms persisted.  The plan was to start amitriptyline 10 mg at bedtime to treat visceral hypersensitivity.  If she developed heartburn off omeprazole and epigastric pain improved, the plan was to prescribe rabeprazole.  Regarding chronic constipation, this was suboptimally managed with Metamucil 2 teaspoons and MiraLAX.  She had diarrhea if she took MiraLAX daily.  She was to increase Metamucil to 2 tablespoons daily and add MiraLAX one half capful (2 teaspoons) daily.  Recommended that she adjust MiraLAX to effect and continue Metamucil daily. Her  assists with translation.  She has significantly improved.  She is taking omeprazole 20 mg every morning and dicyclomine 10 mg every morning before breakfast.  She has no pain or heartburn throughout the day.  She is taking Metamucil or MiraLAX every day.  Occasionally, she takes both.  She is having regular bowel movements.  She denies other abdominal symptoms.

## 2025-02-18 NOTE — HPI-OTHER HISTORIES
EGD 10/25/2024:No endoscopic abnormality evident to explain dysphagia status post 45 [place] Savary dilation, normal stomach, normal examined duodenum. No specimens collected.  Colonoscopy 10/25/2024:Excellent prep, sigmoid and descending diverticulosis, normal examined terminal ileum, nonbleeding internal hemorrhoids. No specimens were collected. Repeat colonoscopy for screening recommended in 2034.  CT abdomen without contrast 6/28/2024:No acute abnormality in the abdomen. Large volume of feces in the colon, suggestive of constipation. Compression fracture of L1 vertebral body is new since prior CT 6/6/2023. Compression fractures of T11 and L4 vertebral bodies are unchanged.

## 2025-07-18 ENCOUNTER — OFFICE VISIT (OUTPATIENT)
Dept: URBAN - METROPOLITAN AREA CLINIC 113 | Facility: CLINIC | Age: 70
End: 2025-07-18
Payer: MEDICARE

## 2025-07-18 DIAGNOSIS — R14.0 ABDOMINAL BLOATING: ICD-10-CM

## 2025-07-18 DIAGNOSIS — K21.9 GERD WITHOUT ESOPHAGITIS: ICD-10-CM

## 2025-07-18 DIAGNOSIS — K59.09 CHRONIC CONSTIPATION: ICD-10-CM

## 2025-07-18 PROCEDURE — 99214 OFFICE O/P EST MOD 30 MIN: CPT | Performed by: NURSE PRACTITIONER

## 2025-07-18 RX ORDER — ONDANSETRON HYDROCHLORIDE 8 MG/1
1 TABLET AS NEEDED TABLET, FILM COATED ORAL ONCE A DAY
Status: ACTIVE | COMMUNITY

## 2025-07-18 RX ORDER — NITROGLYCERIN 0.4 MG/1
AS DIRECTED TABLET SUBLINGUAL
Status: ACTIVE | COMMUNITY

## 2025-07-18 RX ORDER — ISOSORBIDE MONONITRATE 30 MG/1
1 TABLET IN THE MORNING TABLET, EXTENDED RELEASE ORAL ONCE A DAY
Status: DISCONTINUED | COMMUNITY

## 2025-07-18 RX ORDER — GABAPENTIN 600 MG/1
1 TABLET TABLET ORAL THREE TIMES A DAY
Status: ACTIVE | COMMUNITY

## 2025-07-18 RX ORDER — DICYCLOMINE HYDROCHLORIDE 10 MG/1
1 TABLET CAPSULE ORAL
Qty: 100 | Refills: 3 | Status: ACTIVE | COMMUNITY
Start: 2025-01-09 | End: 2026-02-13

## 2025-07-18 RX ORDER — ALENDRONATE SODIUM 70 MG/1
1 TABLET 30 MINUTES BEFORE THE FIRST FOOD, BEVERAGE OR MEDICINE OF THE DAY WITH PLAIN WATER TABLET ORAL
Status: ON HOLD | COMMUNITY

## 2025-07-18 RX ORDER — DOCUSATE SODIUM 100 MG/1
1 CAPSULE AS NEEDED CAPSULE ORAL TWICE A DAY
Status: ON HOLD | COMMUNITY

## 2025-07-18 RX ORDER — RANOLAZINE 1000 MG/1
1 TABLET TABLET, EXTENDED RELEASE ORAL TWICE A DAY
Status: DISCONTINUED | COMMUNITY

## 2025-07-18 RX ORDER — RIMEGEPANT SULFATE 75 MG/75MG
1 TABLET ON THE TONGUE AND ALLOW TO DISSOLVE TABLET, ORALLY DISINTEGRATING ORAL
Status: ACTIVE | COMMUNITY

## 2025-07-18 RX ORDER — OMEPRAZOLE 20 MG/1
1 CAPSULE 30 MINUTES BEFORE MORNING MEAL CAPSULE, DELAYED RELEASE ORAL ONCE A DAY
Qty: 90 | Refills: 3 | Status: ACTIVE | COMMUNITY

## 2025-07-18 RX ORDER — ROSUVASTATIN CALCIUM 40 MG/1
1 TABLET TABLET, COATED ORAL ONCE A DAY
Status: DISCONTINUED | COMMUNITY

## 2025-07-18 RX ORDER — HYDROCHLOROTHIAZIDE 25 MG/1
1 TABLET IN THE MORNING TABLET ORAL ONCE A DAY
Status: DISCONTINUED | COMMUNITY

## 2025-07-18 RX ORDER — ALBUTEROL SULFATE 90 UG/1
1 PUFF AS NEEDED AEROSOL, METERED RESPIRATORY (INHALATION)
Status: ACTIVE | COMMUNITY

## 2025-07-18 RX ORDER — ATORVASTATIN CALCIUM 80 MG/1
1 TABLET TABLET, FILM COATED ORAL ONCE A DAY
Status: ON HOLD | COMMUNITY

## 2025-07-18 RX ORDER — OMEPRAZOLE 40 MG/1
1 CAPSULE 30 MINUTES BEFORE MORNING MEAL CAPSULE, DELAYED RELEASE ORAL TWICE A DAY
Qty: 180 | Refills: 3

## 2025-07-18 RX ORDER — OMEPRAZOLE 20 MG/1
1 CAPSULE 30 MINUTES BEFORE MORNING MEAL CAPSULE, DELAYED RELEASE ORAL ONCE A DAY
Refills: 5 | Status: ON HOLD | COMMUNITY
Start: 2024-06-14

## 2025-07-18 RX ORDER — APIXABAN 5 MG/1
1 TABLET TABLET, FILM COATED ORAL TWICE A DAY
Status: ACTIVE | COMMUNITY

## 2025-07-18 RX ORDER — CYCLOBENZAPRINE HYDROCHLORIDE 10 MG/1
1 TABLET AT BEDTIME AS NEEDED TABLET, FILM COATED ORAL ONCE A DAY
Status: ON HOLD | COMMUNITY

## 2025-07-18 RX ORDER — ROMOSOZUMAB-AQQG 105 MG/1.17ML
2.34 ML INJECTION, SOLUTION SUBCUTANEOUS
Status: ACTIVE | COMMUNITY

## 2025-07-18 RX ORDER — METHOCARBAMOL 750 MG/1
1 TABLET TABLET ORAL TWICE DAILY
Status: ACTIVE | COMMUNITY

## 2025-07-18 NOTE — HPI-OTHER HISTORIES
Labs  7/8/2025:Urinalysis notable for 3+ bilirubin, small leukocyte Estrace. 7/1/2025:CBC: WBC 8.90, hemoglobin 15, .9, platelet 164. BMP normal with exception of glucose 107. LFTs normal TB 0.7, ALP 52, ALT 33, AST 26. Troponin high-sensitivity less than 3. Lipase 30.  Chest x-ray 7/1/2025:No acute process.  CT abdomen and pelvis with contrast 7/1/2025:Mild colonic constipation and small bowel stasis pattern. Previous cholecystectomy and prominent bile ducts. Aortoiliac vascular calcifications and bilateral iliac venous stents. Previous hysterectomy and mild lumbar spondylosis.  EGD 10/25/2024:No endoscopic abnormality evident to explain dysphagia status post 45 [place] Savary dilation, normal stomach, normal examined duodenum. No specimens collected.  Colonoscopy 10/25/2024:Excellent prep, sigmoid and descending diverticulosis, normal examined terminal ileum, nonbleeding internal hemorrhoids. No specimens were collected. Repeat colonoscopy for screening recommended in 2034.  CT abdomen without contrast 6/28/2024:No acute abnormality in the abdomen. Large volume of feces in the colon, suggestive of constipation. Compression fracture of L1 vertebral body is new since prior CT 6/6/2023. Compression fractures of T11 and L4 vertebral bodies are unchanged.

## 2025-07-18 NOTE — HPI-TODAY'S VISIT:
This is a 69-year-old female with a history of asthma, hypercholesterolemia, hypertension, atrial fibrillation on Eliquis, coronary artery disease status post bypass, pacemaker placement and AICD placement, chronic constipation, GERD, esophageal dysphagia, epigastric pain presenting for follow-up regarding acute abdominal pain. She was last seen 2/18/2025.  GERD was controlled with omeprazole 20 mg daily.  Epigastric pain had resolved with daily use of omeprazole and dicyclomine.  This was likely associated with functional dyspepsia/functional abdominal pain.  She had undergone a CT scan in June 2024 showing no acute abnormality and large volume feces in the colon.  Also noted was a compression fracture of L1 that was new since prior CT in 2023.  She was to continue her current regimen and, at some point, try dicyclomine on an as-needed basis.  Chronic constipation was controlled with MiraLAX and Benefiber. Her  assist with interpretation. She went to [facility] 2 weeks ago due to pain indicated in the epigastrium radiating to her back.  She had labs and a CT scan.  She was diagnosed with constipation.  MiraLAX/Gatorade flush was recommended and provided benefit..  She continues to report pain.  She will take MiraLAX and stop it after 1 or 2 bowel movements.  She has persistent pain that is worse after eating.  She is complaining of bloating and gas.  She has also experienced a loss of appetite and dizziness.  She is currently being treated for urinary tract infection. Labs 7/8/2025:Urinalysis notable for 3+ bilirubin, small leukocyte Estrace. 7/1/2025:CBC: WBC 8.90, hemoglobin 15, .9, platelet 164.  BMP normal with exception of glucose 107.  LFTs normal TB 0.7, ALP 52, ALT 33, AST 26.  Troponin high-sensitivity less than 3.  Lipase 30. Chest x-ray 7/1/2025:No acute process. CT abdomen and pelvis with contrast 7/1/2025:Mild colonic constipation and small bowel stasis pattern.  Previous cholecystectomy and prominent bile ducts.  Aortoiliac vascular calcifications and bilateral iliac venous stents.  Previous hysterectomy and mild lumbar spondylosis.

## 2025-07-24 ENCOUNTER — WEB ENCOUNTER (OUTPATIENT)
Dept: URBAN - METROPOLITAN AREA CLINIC 113 | Facility: CLINIC | Age: 70
End: 2025-07-24

## 2025-07-24 RX ORDER — OMEPRAZOLE 20 MG/1
TWICE A DAY CAPSULE, DELAYED RELEASE ORAL TWICE A DAY
Qty: 180 | Refills: 3

## 2025-08-18 ENCOUNTER — OFFICE VISIT (OUTPATIENT)
Dept: URBAN - METROPOLITAN AREA CLINIC 113 | Facility: CLINIC | Age: 70
End: 2025-08-18